# Patient Record
Sex: FEMALE | Race: WHITE | NOT HISPANIC OR LATINO | Employment: UNEMPLOYED | URBAN - METROPOLITAN AREA
[De-identification: names, ages, dates, MRNs, and addresses within clinical notes are randomized per-mention and may not be internally consistent; named-entity substitution may affect disease eponyms.]

---

## 2021-06-03 ENCOUNTER — TELEPHONE (OUTPATIENT)
Dept: SCHEDULING | Facility: IMAGING CENTER | Age: 31
End: 2021-06-03

## 2021-09-24 ENCOUNTER — TELEPHONE (OUTPATIENT)
Dept: SCHEDULING | Facility: IMAGING CENTER | Age: 31
End: 2021-09-24

## 2021-11-12 ENCOUNTER — TELEPHONE (OUTPATIENT)
Dept: SCHEDULING | Facility: IMAGING CENTER | Age: 31
End: 2021-11-12

## 2021-12-16 ENCOUNTER — OFFICE VISIT (OUTPATIENT)
Dept: MEDICAL GROUP | Facility: MEDICAL CENTER | Age: 31
End: 2021-12-16
Attending: PHYSICIAN ASSISTANT
Payer: COMMERCIAL

## 2021-12-16 VITALS
DIASTOLIC BLOOD PRESSURE: 78 MMHG | SYSTOLIC BLOOD PRESSURE: 118 MMHG | WEIGHT: 148 LBS | HEIGHT: 62 IN | RESPIRATION RATE: 18 BRPM | HEART RATE: 90 BPM | BODY MASS INDEX: 27.23 KG/M2 | TEMPERATURE: 99 F | OXYGEN SATURATION: 97 %

## 2021-12-16 DIAGNOSIS — F41.9 ANXIETY: ICD-10-CM

## 2021-12-16 DIAGNOSIS — Z13.29 THYROID DISORDER SCREENING: ICD-10-CM

## 2021-12-16 DIAGNOSIS — Z86.19 HISTORY OF HEPATITIS C: Primary | ICD-10-CM

## 2021-12-16 PROBLEM — F99 MENTAL DISORDER: Status: ACTIVE | Noted: 2021-12-16

## 2021-12-16 PROCEDURE — 99203 OFFICE O/P NEW LOW 30 MIN: CPT | Performed by: PHYSICIAN ASSISTANT

## 2021-12-16 PROCEDURE — 99213 OFFICE O/P EST LOW 20 MIN: CPT | Performed by: PHYSICIAN ASSISTANT

## 2021-12-16 RX ORDER — PAROXETINE HYDROCHLORIDE 40 MG/1
50 TABLET, FILM COATED ORAL DAILY
COMMUNITY

## 2021-12-16 RX ORDER — BUPRENORPHINE AND NALOXONE 8; 2 MG/1; MG/1
FILM, SOLUBLE BUCCAL; SUBLINGUAL
COMMUNITY

## 2021-12-16 RX ORDER — MIRTAZAPINE 30 MG/1
30 TABLET, FILM COATED ORAL NIGHTLY
COMMUNITY

## 2021-12-16 RX ORDER — CLONIDINE HYDROCHLORIDE 0.3 MG/1
0.5 TABLET ORAL 2 TIMES DAILY
COMMUNITY

## 2021-12-16 RX ORDER — ALPRAZOLAM 1 MG/1
1 TABLET ORAL
Qty: 30 TABLET | Refills: 0 | Status: SHIPPED | OUTPATIENT
Start: 2021-12-16 | End: 2022-01-15

## 2021-12-16 ASSESSMENT — PATIENT HEALTH QUESTIONNAIRE - PHQ9: CLINICAL INTERPRETATION OF PHQ2 SCORE: 0

## 2021-12-16 NOTE — ASSESSMENT & PLAN NOTE
Marybel presents today to establish care. She reports a chronic history of hepatitis C infection. She states that she was diagnosed with this years ago and never was evaluated for treatment. She is requesting a referral for treatment.

## 2021-12-16 NOTE — ASSESSMENT & PLAN NOTE
32 yo female presents today for evaluation of depressed mood and/or loss of interests/pleasure most days. There is no imminent risk of serious self-harm/suicide. She reports that she currently sees a Psychiatrist who manages her medications. She reports that she has been experiencing anxiety attacks and reports that they get so severe that she experiences sweaty hands, dizziness and lightheadedness. She states that due to this she has passed out a few times. She reports that she has been on Xanax 4 mg  Daily in the past to help with her anxiety attack and depression and reports that this has been very beneficial in controlling her symptoms. She was told by her Psychiatrist to establish care with a PCP in order to obtain this prescription. She presents today for a medication refill. She denies any SI or HI.    Low-Salt Choices  Eating salt (sodium) can make your body retain too much water. Excess water makes your heart work harder. Canned, packaged, and frozen foods are easy to prepare. But they are often high in sodium.  Here are some ideas for low-salt foods

## 2021-12-16 NOTE — PROGRESS NOTES
Chief Complaint   Patient presents with   • Establish Care     Subjective:     HPI:   Marybel Kidd is a 31 y.o. female here to establish care and to discuss the evaluation and management of:    Anxiety  30 yo female presents today for evaluation of depressed mood and/or loss of interests/pleasure most days. There is no imminent risk of serious self-harm/suicide. She reports that she currently sees a Psychiatrist who manages her medications. She reports that she has been experiencing anxiety attacks and reports that they get so severe that she experiences sweaty hands, dizziness and lightheadedness. She states that due to this she has passed out a few times. She reports that she has been on Xanax 4 mg  Daily in the past to help with her anxiety attack and depression and reports that this has been very beneficial in controlling her symptoms. She was told by her Psychiatrist to establish care with a PCP in order to obtain this prescription. She presents today for a medication refill. She denies any SI or HI.     History of hepatitis C  Marybel presents today to establish care. She reports a chronic history of hepatitis C infection. She states that she was diagnosed with this years ago and never was evaluated for treatment. She is requesting a referral for treatment.    ROS  See HPI.     No Known Allergies    Current medicines (including changes today)  Current Outpatient Medications   Medication Sig Dispense Refill   • paroxetine (PAXIL) 40 MG tablet Take 50 mg by mouth every day.     • mirtazapine (REMERON) 30 MG Tab tablet Take 30 mg by mouth every evening.     • cloNIDine (CATAPRES) 0.3 MG Tab Take 0.5 mg by mouth 2 times a day.     • Buprenorphine HCl-Naloxone HCl (SUBOXONE) 8-2 MG FILM Place  under the tongue.     • ALPRAZolam (XANAX) 1 MG Tab Take 1 Tablet by mouth 1 time a day as needed for Anxiety for up to 30 days. 30 Tablet 0     No current facility-administered medications for this visit.     She  has no  "past medical history on file.  She  has no past surgical history on file.  Social History     Tobacco Use   • Smoking status: Never Smoker   • Smokeless tobacco: Never Used   Vaping Use   • Vaping Use: Every day   • Substances: Nicotine   Substance Use Topics   • Alcohol use: Never   • Drug use: Never     Family History   Problem Relation Age of Onset   • Diabetes Mother         T1DM   • Hypertension Mother    • Hyperlipidemia Father    • Cancer Neg Hx    • Lung Disease Neg Hx    • Heart Disease Neg Hx      Family Status   Relation Name Status   • Mo  Alive   • Fa     • Neg Hx  (Not Specified)     Patient Active Problem List    Diagnosis Date Noted   • Mental disorder 2021   • Anxiety 2021   • History of hepatitis C 2021      Objective:     /78 (BP Location: Right arm, Patient Position: Sitting, BP Cuff Size: Adult)   Pulse 90   Temp 37.2 °C (99 °F) (Temporal)   Resp 18   Ht 1.575 m (5' 2\")   Wt 67.1 kg (148 lb)   SpO2 97%  Body mass index is 27.07 kg/m².    Physical Exam:  Physical Exam  Vitals reviewed.   Constitutional:       Appearance: Normal appearance.   HENT:      Head: Normocephalic.      Right Ear: External ear normal.      Left Ear: External ear normal.   Eyes:      Pupils: Pupils are equal, round, and reactive to light.   Neck:      Thyroid: No thyromegaly.   Cardiovascular:      Rate and Rhythm: Normal rate and regular rhythm.      Heart sounds: Normal heart sounds.   Pulmonary:      Effort: Pulmonary effort is normal.      Breath sounds: Normal breath sounds.   Chest:   Breasts:      Right: No supraclavicular adenopathy.      Left: No supraclavicular adenopathy.       Musculoskeletal:         General: Normal range of motion.      Cervical back: Normal range of motion.   Lymphadenopathy:      Cervical: No cervical adenopathy.      Upper Body:      Right upper body: No supraclavicular adenopathy.      Left upper body: No supraclavicular adenopathy.   Skin:     " General: Skin is warm and dry.   Neurological:      General: No focal deficit present.      Mental Status: She is alert and oriented to person, place, and time.      Gait: Gait is intact.   Psychiatric:         Mood and Affect: Mood and affect normal.         Behavior: Behavior normal.         Judgment: Judgment normal.       Assessment and Plan:     The following treatment plan was discussed:    1. Anxiety  - This is a chronic condition.  -Plan: Discussed with the patient that I am unable to prescribe her 4 mg of alprazolam to be taken on a daily basis. I discussed with her that she will need to follow-up with her psychiatrist who will be the one to prescribe and monitor this medication. She does not see her psychiatrist for another month. Therefore, I am willing to write her a prescription for 1 mg once a day. She is aware that this is a one-time prescription and I will no longer provide her with a prescription of alprazolam once the prescription expires. Patient verbalized understanding is in agreement with plan. Aloe up with psychiatry.  - ALPRAZolam (XANAX) 1 MG Tab; Take 1 Tablet by mouth 1 time a day as needed for Anxiety for up to 30 days.  Dispense: 30 Tablet; Refill: 0    2. History of hepatitis C  - This is a chronic untreated condition.  - Plan: Obtain updated hep C labs. Referral to Infectious Disease placed.  - HEP C VIRUS ANTIBODY; Future    3. Thyroid disorder screening  - TSH WITH REFLEX TO FT4; Future    Any change or worsening of signs or symptoms, patient encouraged to follow-up or report to emergency room for further evaluation. Patient verbalizes understanding and agrees.    Follow-Up: Return if symptoms worsen or fail to improve.      PLEASE NOTE: This dictation was created using voice recognition software. I have made every reasonable attempt to correct obvious errors, but I expect that there are errors of grammar and possibly content that I did not discover before finalizing the note.

## 2023-01-10 ENCOUNTER — HOSPITAL ENCOUNTER (EMERGENCY)
Facility: MEDICAL CENTER | Age: 33
End: 2023-01-10
Attending: EMERGENCY MEDICINE
Payer: COMMERCIAL

## 2023-01-10 VITALS
BODY MASS INDEX: 25.76 KG/M2 | TEMPERATURE: 97.1 F | HEART RATE: 89 BPM | WEIGHT: 140 LBS | DIASTOLIC BLOOD PRESSURE: 60 MMHG | RESPIRATION RATE: 25 BRPM | OXYGEN SATURATION: 92 % | HEIGHT: 62 IN | SYSTOLIC BLOOD PRESSURE: 124 MMHG
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
## 2023-01-10 DIAGNOSIS — M62.838 MUSCLE SPASM: Primary | ICD-10-CM

## 2023-01-10 DIAGNOSIS — S13.4XXA WHIPLASH INJURY TO NECK, INITIAL ENCOUNTER: ICD-10-CM

## 2023-01-10 PROCEDURE — 305948 HCHG GREEN TRAUMA ACT PRE-NOTIFY NO CC

## 2023-01-10 PROCEDURE — 99284 EMERGENCY DEPT VISIT MOD MDM: CPT

## 2023-01-10 RX ORDER — LIDOCAINE 50 MG/G
1 PATCH TOPICAL EVERY 24 HOURS
Qty: 5 PATCH | Refills: 0 | Status: SHIPPED | OUTPATIENT
Start: 2023-01-10 | End: 2023-01-15

## 2023-01-10 RX ORDER — IBUPROFEN 800 MG/1
800 TABLET ORAL EVERY 8 HOURS PRN
Qty: 20 TABLET | Refills: 0 | Status: SHIPPED | OUTPATIENT
Start: 2023-01-10 | End: 2023-01-10 | Stop reason: SDUPTHER

## 2023-01-10 RX ORDER — IBUPROFEN 800 MG/1
800 TABLET ORAL EVERY 8 HOURS PRN
Qty: 20 TABLET | Refills: 0 | Status: SHIPPED | OUTPATIENT
Start: 2023-01-10 | End: 2023-01-13

## 2023-01-10 RX ORDER — ACETAMINOPHEN 500 MG
1000 TABLET ORAL EVERY 6 HOURS PRN
Qty: 20 TABLET | Refills: 0 | Status: SHIPPED | OUTPATIENT
Start: 2023-01-10 | End: 2023-01-10 | Stop reason: SDUPTHER

## 2023-01-10 RX ORDER — CYCLOBENZAPRINE HCL 10 MG
10 TABLET ORAL 3 TIMES DAILY PRN
Qty: 10 TABLET | Refills: 0 | Status: SHIPPED | OUTPATIENT
Start: 2023-01-10 | End: 2023-01-10 | Stop reason: SDUPTHER

## 2023-01-10 RX ORDER — ACETAMINOPHEN 500 MG
1000 TABLET ORAL EVERY 6 HOURS PRN
Qty: 20 TABLET | Refills: 0 | Status: SHIPPED | OUTPATIENT
Start: 2023-01-10 | End: 2023-01-14

## 2023-01-10 RX ORDER — LIDOCAINE 50 MG/G
1 PATCH TOPICAL EVERY 24 HOURS
Qty: 5 PATCH | Refills: 0 | Status: SHIPPED | OUTPATIENT
Start: 2023-01-10 | End: 2023-01-10 | Stop reason: SDUPTHER

## 2023-01-10 RX ORDER — CYCLOBENZAPRINE HCL 10 MG
10 TABLET ORAL 3 TIMES DAILY PRN
Qty: 10 TABLET | Refills: 0 | Status: SHIPPED | OUTPATIENT
Start: 2023-01-10 | End: 2023-01-13
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
## 2023-01-10 NOTE — ED NOTES
PIV removed, catheter intact. Discharge education provided. Prescription to be picked up by pt. All belongings with pt. Pt ambulated to lobby unassisted with steady gait.
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
## 2023-02-07 ENCOUNTER — APPOINTMENT (OUTPATIENT)
Dept: URGENT CARE | Facility: PHYSICIAN GROUP | Age: 33
End: 2023-02-07
Payer: COMMERCIAL

## 2023-02-07 ENCOUNTER — HOSPITAL ENCOUNTER (EMERGENCY)
Facility: MEDICAL CENTER | Age: 33
End: 2023-02-07
Payer: COMMERCIAL

## 2023-02-07 VITALS
SYSTOLIC BLOOD PRESSURE: 127 MMHG | TEMPERATURE: 98.9 F | RESPIRATION RATE: 16 BRPM | OXYGEN SATURATION: 96 % | DIASTOLIC BLOOD PRESSURE: 79 MMHG | HEIGHT: 62 IN | BODY MASS INDEX: 25.76 KG/M2 | HEART RATE: 76 BPM | WEIGHT: 140 LBS

## 2023-02-07 LAB
AMORPH CRY #/AREA URNS HPF: PRESENT /HPF
APPEARANCE UR: ABNORMAL
BACTERIA #/AREA URNS HPF: ABNORMAL /HPF
COLOR UR: ABNORMAL
EPI CELLS #/AREA URNS HPF: ABNORMAL /HPF
HYALINE CASTS #/AREA URNS LPF: ABNORMAL /LPF
MICRO URNS: ABNORMAL
RBC # URNS HPF: ABNORMAL /HPF
WBC #/AREA URNS HPF: ABNORMAL /HPF

## 2023-02-07 PROCEDURE — 87086 URINE CULTURE/COLONY COUNT: CPT

## 2023-02-07 PROCEDURE — 302449 STATCHG TRIAGE ONLY (STATISTIC)

## 2023-02-07 PROCEDURE — 81001 URINALYSIS AUTO W/SCOPE: CPT

## 2023-02-07 NOTE — ED TRIAGE NOTES
"Chief Complaint   Patient presents with    Painful Urination     X2 days       Pt is alert and oriented, speaking in full sentences, follows commands and responds appropriately to questions.      Pt placed in lobby. Pt educated on triage process and apologized for wait time. Pt encouraged to alert staff for any changes.     Patient and staff wearing appropriate PPE      /79   Pulse 76   Temp 37.2 °C (98.9 °F) (Temporal)   Resp 16   Ht 1.575 m (5' 2\")   Wt 63.5 kg (140 lb)   SpO2 96%       "

## 2023-02-09 LAB
BACTERIA UR CULT: NORMAL
SIGNIFICANT IND 70042: NORMAL
SITE SITE: NORMAL
SOURCE SOURCE: NORMAL

## 2024-05-03 ENCOUNTER — HOSPITAL ENCOUNTER (OUTPATIENT)
Dept: RADIOLOGY | Facility: MEDICAL CENTER | Age: 34
End: 2024-05-03
Attending: OBSTETRICS & GYNECOLOGY
Payer: MEDICAID

## 2024-05-03 DIAGNOSIS — B18.2 HEPATITIS C CARRIER (HCC): ICD-10-CM

## 2024-06-01 ENCOUNTER — HOSPITAL ENCOUNTER (OUTPATIENT)
Dept: RADIOLOGY | Facility: MEDICAL CENTER | Age: 34
End: 2024-06-01
Attending: OBSTETRICS & GYNECOLOGY
Payer: MEDICAID

## 2024-06-01 PROCEDURE — 76705 ECHO EXAM OF ABDOMEN: CPT

## 2024-06-23 ENCOUNTER — HOSPITAL ENCOUNTER (EMERGENCY)
Facility: MEDICAL CENTER | Age: 34
End: 2024-06-23
Attending: OBSTETRICS & GYNECOLOGY | Admitting: OBSTETRICS & GYNECOLOGY
Payer: MEDICAID

## 2024-06-23 VITALS
BODY MASS INDEX: 27.6 KG/M2 | DIASTOLIC BLOOD PRESSURE: 64 MMHG | SYSTOLIC BLOOD PRESSURE: 115 MMHG | WEIGHT: 150 LBS | TEMPERATURE: 98 F | HEIGHT: 62 IN | HEART RATE: 98 BPM

## 2024-06-23 PROCEDURE — 99284 EMERGENCY DEPT VISIT MOD MDM: CPT

## 2024-06-23 RX ORDER — VITAMIN B COMPLEX
1000 TABLET ORAL DAILY
COMMUNITY

## 2024-06-23 NOTE — PROGRESS NOTES
EDC  EGA 26.5    Pt presented to L&D for c/o decreased fetal movement, denies LOF, vaginal bleeding, UC's. EFM and TOCO applied. Dr. Dean notified of pt arrival. Will come evaluate pt.

## 2024-06-23 NOTE — ED PROVIDER NOTES
"OB ED Note    CC: Decreased fetal movement    HPI: Marybel Kidd is a 34-year-old G1, P0 who presents today at 26 weeks and 5 days with complaints of decreased fetal movement.  She denies bleeding or leaking fluid.  She denies contractions.  She was standing around [employer] yesterday and thought she might of gotten dehydrated.  She normally experiences small kicks throughout the day but has felt nothing today thus far.  Since arriving in OB triage she has felt more movement than she has all day.    ROS: All other systems were reviewed and were found to be negative    /64   Pulse 98   Temp 36.7 °C (98 °F) (Temporal)   Ht 1.575 m (5' 2\")   Wt 68 kg (150 lb)   BMI 27.44 kg/m²     General: No apparent distress  Abdomen: Gravid, nontender  Extremities: No edema    FHT: Baseline of 140s, moderate variability present, accelerations present, decelerations absent  Tocometer: Quiescent    Assessment:   IUP at 26 weeks 5 days  Decreased fetal movement    Plan:  I reviewed the fetal heart tracing with the patient.  We discussed the components that are very reassuring.  We discussed normal movement patterns for babies prior to the third trimester and when to seek emergent care.  Patient is reassured.  Stable for discharge home and follow-up in clinic with Dr. Coffey as previously scheduled.    Terri Dean M.D.    "
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
## 2024-07-09 ENCOUNTER — HOSPITAL ENCOUNTER (EMERGENCY)
Facility: MEDICAL CENTER | Age: 34
End: 2024-07-09
Attending: OBSTETRICS & GYNECOLOGY | Admitting: OBSTETRICS & GYNECOLOGY
Payer: MEDICAID

## 2024-07-09 VITALS — HEIGHT: 62 IN | RESPIRATION RATE: 16 BRPM | WEIGHT: 155 LBS | TEMPERATURE: 98.2 F | BODY MASS INDEX: 28.52 KG/M2

## 2024-07-09 PROCEDURE — 59025 FETAL NON-STRESS TEST: CPT

## 2024-07-09 PROCEDURE — 99282 EMERGENCY DEPT VISIT SF MDM: CPT | Mod: 25

## 2024-07-09 ASSESSMENT — ENCOUNTER SYMPTOMS
CONSTITUTIONAL NEGATIVE: 1
RESPIRATORY NEGATIVE: 1
MUSCULOSKELETAL NEGATIVE: 1
GASTROINTESTINAL NEGATIVE: 1
CARDIOVASCULAR NEGATIVE: 1